# Patient Record
Sex: FEMALE | Race: WHITE | Employment: UNEMPLOYED | URBAN - METROPOLITAN AREA
[De-identification: names, ages, dates, MRNs, and addresses within clinical notes are randomized per-mention and may not be internally consistent; named-entity substitution may affect disease eponyms.]

---
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
## 2021-02-12 ENCOUNTER — HOSPITAL ENCOUNTER (EMERGENCY)
Age: 21
Discharge: HOME | End: 2021-02-12
Payer: COMMERCIAL

## 2021-02-12 VITALS
TEMPERATURE: 98.4 F | BODY MASS INDEX: 32.6 KG/M2 | HEIGHT: 63 IN | HEART RATE: 90 BPM | RESPIRATION RATE: 18 BRPM | WEIGHT: 184 LBS | SYSTOLIC BLOOD PRESSURE: 119 MMHG | DIASTOLIC BLOOD PRESSURE: 80 MMHG | OXYGEN SATURATION: 96 %

## 2021-02-12 DIAGNOSIS — J02.8 SORE THROAT (VIRAL): ICD-10-CM

## 2021-02-12 DIAGNOSIS — J06.9 VIRAL URI: Primary | ICD-10-CM

## 2021-02-12 DIAGNOSIS — Z20.822 SUSPECTED 2019 NOVEL CORONAVIRUS INFECTION: ICD-10-CM

## 2021-02-12 DIAGNOSIS — B97.89 SORE THROAT (VIRAL): ICD-10-CM

## 2021-02-12 LAB — STREP GRP A PCR: NEGATIVE

## 2021-02-12 PROCEDURE — U0003 INFECTIOUS AGENT DETECTION BY NUCLEIC ACID (DNA OR RNA); SEVERE ACUTE RESPIRATORY SYNDROME CORONAVIRUS 2 (SARS-COV-2) (CORONAVIRUS DISEASE [COVID-19]), AMPLIFIED PROBE TECHNIQUE, MAKING USE OF HIGH THROUGHPUT TECHNOLOGIES AS DESCRIBED BY CMS-2020-01-R: HCPCS

## 2021-02-12 PROCEDURE — 87880 STREP A ASSAY W/OPTIC: CPT

## 2021-02-12 PROCEDURE — 99211 OFF/OP EST MAY X REQ PHY/QHP: CPT

## 2021-02-13 ENCOUNTER — CARE COORDINATION (OUTPATIENT)
Dept: CARE COORDINATION | Age: 21
End: 2021-02-13

## 2021-02-14 LAB — SARS-COV-2, PCR: NOT DETECTED

## 2021-02-15 ENCOUNTER — CARE COORDINATION (OUTPATIENT)
Dept: CARE COORDINATION | Age: 21
End: 2021-02-15

## 2021-02-15 NOTE — ED PROVIDER NOTES
Department of Emergency [facility]  Provider Note  Admit Date/RoomTime: 2021  7:07 PM  Room:   NAME: Vivienne Sam  : 2000  MRN: [id number]     Chief Complaint:  Nasal Congestion, Cough, Pharyngitis, and Emesis    History of Present Illness      Vivienne Sam is a 24 y.o. old female who has a past medical history of: History reviewed. No pertinent past medical history. presents to the urgent care center by private vehicle, for nasal congestion and a sore throat. States that she also had 1 emesis. She denies any abdominal pain. She reports that she is pregnant--  About 7 weeks. She denies any vaginal discharge or bleeding or drainage. She started getting sick yesterday. Exposed To: Streptococcus: no.                              Infectious Mononucleosis:  no.        Symptoms:  Pain:  Yes. Muffled Voice:  No.                            Hoarse:  No.                            Difficulty with Secretions:  No.        ROS    Pertinent positives and negatives are stated within HPI, all other systems reviewed and are negative. Past Surgical History:  has a past surgical history that includes Tonsillectomy. Social History:  reports that she is a non-smoker but has been exposed to tobacco smoke. She has never used smokeless tobacco. She reports previous alcohol use. She reports that she does not use drugs. Family History: family history is not on file. Allergies: Patient has no known allergies. Physical Exam           ED Triage Vitals [21 1916]   BP Temp Temp Source Heart Rate Resp SpO2 Height Weight   119/80 98.4 °F (36.9 °C) Temporal 90 18 96 % 5' 3\" (1.6 m) 184 lb (83.5 kg)     Oxygen Saturation Interpretation: Normal.    Constitutional:  Alert, development consistent with age. .  Ears:  TMs without perforation, injection, or bulging. External canals clear without exudate.   Nose:   There is no discharge, swelling or lesions noted. Throat: Airway Patent. no erythema or exudates noted. Teeth and gums normal..       Neck/Lymphatic:  neckSupple. There is no  anterior cervical node tenderness. respiratory:  Clear to auscultation and breath sounds equal.    CV: Regular rate and rhythm, normal heart sounds, without pathological murmurs, ectopy, gallops, or rubs. GI:  Abdomen Soft, nontender, good bowel sounds. No firm or pulsatile mass. Inegument:  No rashes, erythema present. Neurological:  Oriented. Motor functions intact. Lab / Imaging Results   (All laboratory and radiology results have been personally reviewed by myself)  Labs:  Results for orders placed or performed during the hospital encounter of 02/12/21   Strep Screen Group A Throat    Specimen: Throat   Result Value Ref Range    Strep Grp A PCR Negative Negative   COVID-19   Result Value Ref Range    SARS-CoV-2, PCR Not Detected Not Detected     Imaging: All Radiology results interpreted by Radiologist unless otherwise noted. No orders to display       ED Course / Medical Decision Making   Medications - No data to display       MDM:        Patient with a sore throat congestion and slight cough her lungs are clear her sats 96% her temperature is normal at 98.4 her exam is negative I did do a rapid strep and it was negative. I also sent out testing for Covid. I advised her that the most likely this is just a viral URI she needs to treat her symptoms follow-up with her PCP if anything worsen she should get reevaluated. She should stay in isolation until her Covid test comes back. Plan of Care: Normal progression of disease discussed. All questions answered. Explained the rationale for symptomatic treatment rather than use of an antibiotic. Instruction provided in the use of fluids, vaporizer, acetaminophen, and other OTC medication for symptom control. Extra fluids  Analgesics as needed, dose reviewed.   Follow up as needed should symptoms fail to improve. Assessment     1. Viral URI    2. Sore throat (viral)    3. Suspected 2019 novel coronavirus infection      Plan   Discharge to home and advised to contact [employer], [address] [place]    Schedule an appointment as soon as possible for a visit      Patient condition is good    New Medications     Discharge Medication List as of 2/12/2021  8:02 PM        Electronically signed by SAI Parry CNP   DD: 2/15/21  **This report was transcribed using voice recognition software. Every effort was made to ensure accuracy; however, inadvertent computerized transcription errors may be present.   END OF ED PROVIDER NOTE       SAI Parry CNP  02/15/21 5929

## 2021-02-15 NOTE — CARE COORDINATION
Date/Time:  2/15/2021 10:28 AM  Second Attempt to reach  patient by telephone. Call within 2 business days of discharge: Yes   Unable to leave HIPPA compliant message requesting a return call.    Episode closed-unable to reach patient

## 2021-06-09 ENCOUNTER — HOSPITAL ENCOUNTER (OUTPATIENT)
Age: 21
Discharge: HOME | End: 2021-06-09
Payer: COMMERCIAL

## 2021-06-09 LAB
GLUCOSE TOLERANCE SCREEN 50G: 146 MG/DL (ref 70–140)
HCT VFR BLD CALC: 35 % (ref 34–48)
HEMOGLOBIN: 11.4 G/DL (ref 11.5–15.5)
MCH RBC QN AUTO: 30.2 PG (ref 26–35)
MCHC RBC AUTO-ENTMCNC: 32.6 % (ref 32–34.5)
MCV RBC AUTO: 92.6 FL (ref 80–99.9)
PDW BLD-RTO: 14.2 FL (ref 11.5–15)
PLATELET # BLD: 220 E9/L (ref 130–450)
PMV BLD AUTO: 9.9 FL (ref 7–12)
RBC # BLD: 3.78 E12/L (ref 3.5–5.5)
WBC # BLD: 9.6 E9/L (ref 4.5–11.5)

## 2021-06-09 PROCEDURE — 85027 COMPLETE CBC AUTOMATED: CPT

## 2021-06-09 PROCEDURE — 82950 GLUCOSE TEST: CPT

## 2021-06-09 PROCEDURE — 36415 COLL VENOUS BLD VENIPUNCTURE: CPT

## 2021-06-21 ENCOUNTER — HOSPITAL ENCOUNTER (OUTPATIENT)
Age: 21
Discharge: HOME | End: 2021-06-21
Payer: COMMERCIAL

## 2021-06-21 LAB
GLUCOSE FASTING: 76 MG/ML
GLUCOSE TOLERANCE TEST 1 HOUR: 121 MG/DL
GLUCOSE TOLERANCE TEST 2 HOUR: 137 MG/DL
GLUCOSE TOLERANCE TEST 3 HOUR: 117 MG/DL

## 2021-06-21 PROCEDURE — 82951 GLUCOSE TOLERANCE TEST (GTT): CPT

## 2021-06-21 PROCEDURE — 82952 GTT-ADDED SAMPLES: CPT

## 2021-06-21 PROCEDURE — 36415 COLL VENOUS BLD VENIPUNCTURE: CPT

## 2021-07-12 ENCOUNTER — HOSPITAL ENCOUNTER (OUTPATIENT)
Age: 21
Discharge: HOME | End: 2021-07-12
Attending: OBSTETRICS & GYNECOLOGY | Admitting: OBSTETRICS & GYNECOLOGY
Payer: COMMERCIAL

## 2021-07-12 VITALS
HEART RATE: 88 BPM | DIASTOLIC BLOOD PRESSURE: 71 MMHG | TEMPERATURE: 98.1 F | SYSTOLIC BLOOD PRESSURE: 129 MMHG | RESPIRATION RATE: 16 BRPM

## 2021-07-12 LAB
ALBUMIN SERPL-MCNC: 3.5 G/DL (ref 3.5–5.2)
ALP BLD-CCNC: 123 U/L (ref 35–104)
ALT SERPL-CCNC: 6 U/L (ref 0–32)
ANION GAP SERPL CALCULATED.3IONS-SCNC: 11 MMOL/L (ref 7–16)
AST SERPL-CCNC: 10 U/L (ref 0–31)
BACTERIA: NORMAL /HPF
BASOPHILS ABSOLUTE: 0.02 E9/L (ref 0–0.2)
BASOPHILS RELATIVE PERCENT: 0.2 % (ref 0–2)
BILIRUB SERPL-MCNC: 0.2 MG/DL (ref 0–1.2)
BILIRUBIN URINE: NEGATIVE
BLOOD, URINE: NEGATIVE
BUN BLDV-MCNC: 5 MG/DL (ref 6–20)
CALCIUM SERPL-MCNC: 8.9 MG/DL (ref 8.6–10.2)
CHLORIDE BLD-SCNC: 107 MMOL/L (ref 98–107)
CLARITY: CLEAR
CO2: 20 MMOL/L (ref 22–29)
COLOR: YELLOW
CREAT SERPL-MCNC: 0.5 MG/DL (ref 0.5–1)
EOSINOPHILS ABSOLUTE: 0.03 E9/L (ref 0.05–0.5)
EOSINOPHILS RELATIVE PERCENT: 0.3 % (ref 0–6)
GFR AFRICAN AMERICAN: >60
GFR NON-AFRICAN AMERICAN: >60 ML/MIN/1.73
GLUCOSE BLD-MCNC: 95 MG/DL (ref 74–99)
GLUCOSE URINE: NEGATIVE MG/DL
HCT VFR BLD CALC: 34.6 % (ref 34–48)
HEMOGLOBIN: 11.2 G/DL (ref 11.5–15.5)
IMMATURE GRANULOCYTES #: 0.05 E9/L
IMMATURE GRANULOCYTES %: 0.5 % (ref 0–5)
KETONES, URINE: NEGATIVE MG/DL
LEUKOCYTE ESTERASE, URINE: NEGATIVE
LYMPHOCYTES ABSOLUTE: 1.37 E9/L (ref 1.5–4)
LYMPHOCYTES RELATIVE PERCENT: 14 % (ref 20–42)
MCH RBC QN AUTO: 29.9 PG (ref 26–35)
MCHC RBC AUTO-ENTMCNC: 32.4 % (ref 32–34.5)
MCV RBC AUTO: 92.3 FL (ref 80–99.9)
MONOCYTES ABSOLUTE: 0.58 E9/L (ref 0.1–0.95)
MONOCYTES RELATIVE PERCENT: 5.9 % (ref 2–12)
NEUTROPHILS ABSOLUTE: 7.72 E9/L (ref 1.8–7.3)
NEUTROPHILS RELATIVE PERCENT: 79.1 % (ref 43–80)
NITRITE, URINE: NEGATIVE
PDW BLD-RTO: 13.5 FL (ref 11.5–15)
PH UA: 8 (ref 5–9)
PLATELET # BLD: 208 E9/L (ref 130–450)
PMV BLD AUTO: 10.7 FL (ref 7–12)
POTASSIUM SERPL-SCNC: 4 MMOL/L (ref 3.5–5)
PROTEIN UA: NEGATIVE MG/DL
RBC # BLD: 3.75 E12/L (ref 3.5–5.5)
RBC UA: NORMAL /HPF (ref 0–2)
SODIUM BLD-SCNC: 138 MMOL/L (ref 132–146)
SPECIFIC GRAVITY UA: 1.01 (ref 1–1.03)
TOTAL PROTEIN: 6.3 G/DL (ref 6.4–8.3)
UROBILINOGEN, URINE: 0.2 E.U./DL
WBC # BLD: 9.8 E9/L (ref 4.5–11.5)
WBC UA: NORMAL /HPF (ref 0–5)

## 2021-07-12 PROCEDURE — 81001 URINALYSIS AUTO W/SCOPE: CPT

## 2021-07-12 PROCEDURE — 85025 COMPLETE CBC W/AUTO DIFF WBC: CPT

## 2021-07-12 PROCEDURE — 36415 COLL VENOUS BLD VENIPUNCTURE: CPT

## 2021-07-12 PROCEDURE — 99202 OFFICE O/P NEW SF 15 MIN: CPT

## 2021-07-12 PROCEDURE — 2580000003 HC RX 258: Performed by: OBSTETRICS & GYNECOLOGY

## 2021-07-12 PROCEDURE — 80053 COMPREHEN METABOLIC PANEL: CPT

## 2021-07-12 RX ORDER — SODIUM CHLORIDE, SODIUM LACTATE, POTASSIUM CHLORIDE, CALCIUM CHLORIDE 600; 310; 30; 20 MG/100ML; MG/100ML; MG/100ML; MG/100ML
INJECTION, SOLUTION INTRAVENOUS CONTINUOUS
Status: DISCONTINUED | OUTPATIENT
Start: 2021-07-12 | End: 2021-07-12 | Stop reason: HOSPADM

## 2021-07-12 RX ORDER — SODIUM CHLORIDE, SODIUM LACTATE, POTASSIUM CHLORIDE, AND CALCIUM CHLORIDE .6; .31; .03; .02 G/100ML; G/100ML; G/100ML; G/100ML
500 INJECTION, SOLUTION INTRAVENOUS ONCE
Status: COMPLETED | OUTPATIENT
Start: 2021-07-12 | End: 2021-07-12

## 2021-07-12 RX ADMIN — SODIUM CHLORIDE, POTASSIUM CHLORIDE, SODIUM LACTATE AND CALCIUM CHLORIDE 500 ML: 600; 310; 30; 20 INJECTION, SOLUTION INTRAVENOUS at 13:35

## 2021-07-12 RX ADMIN — SODIUM CHLORIDE, POTASSIUM CHLORIDE, SODIUM LACTATE AND CALCIUM CHLORIDE: 600; 310; 30; 20 INJECTION, SOLUTION INTRAVENOUS at 17:37

## 2021-07-12 ASSESSMENT — PAIN DESCRIPTION - DESCRIPTORS
DESCRIPTORS: ACHING;DISCOMFORT;SHARP
DESCRIPTORS: ACHING;DISCOMFORT;SHARP

## 2021-07-12 NOTE — H&P
Subjective:      Tangela Chance is an 24 y.o. female at 27 and 5/7 weeks gestation presenting with   Chief Complaint   Patient presents with    Abdominal Pain     RLQ    Lower Back Pain     Rt. sided    Nausea & Vomiting   . She is also complaining of contractions every a few minutes lasting few seconds. Other associated symptoms include low back pain. Fetal Movement: normal.  History reviewed. No pertinent past medical history. Review of Systems  Pertinent items are noted in HPI. Objective:      /71   Pulse 88   Temp 98.1 °F (36.7 °C)   Resp 16   LMP 12/09/2020   Blood pressure 129/71, pulse 88, temperature 98.1 °F (36.7 °C), resp. rate 16, last menstrual period 12/09/2020. General:   alert, appears stated age and cooperative   Cervix:   defferred.    FHT:   145 BPM     Lab Review    CBC:   Lab Results   Component Value Date    WBC 9.8 07/12/2021    RBC 3.75 07/12/2021    HGB 11.2 07/12/2021    HCT 34.6 07/12/2021    MCV 92.3 07/12/2021    MCH 29.9 07/12/2021    MCHC 32.4 07/12/2021    RDW 13.5 07/12/2021     07/12/2021    MPV 10.7 07/12/2021     CMP:    Lab Results   Component Value Date     07/12/2021    K 4.0 07/12/2021     07/12/2021    CO2 20 07/12/2021    BUN 5 07/12/2021    CREATININE 0.5 07/12/2021    GFRAA >60 07/12/2021    LABGLOM >60 07/12/2021    GLUCOSE 95 07/12/2021    PROT 6.3 07/12/2021    LABALBU 3.5 07/12/2021    CALCIUM 8.9 07/12/2021    BILITOT 0.2 07/12/2021    ALKPHOS 123 07/12/2021    AST 10 07/12/2021    ALT 6 07/12/2021     U/A:    Lab Results   Component Value Date    COLORU Yellow 07/12/2021    PHUR 8.0 07/12/2021    WBCUA 0-1 07/12/2021    RBCUA 0-1 07/12/2021    BACTERIA NONE SEEN 07/12/2021    CLARITYU Clear 07/12/2021    SPECGRAV 1.015 07/12/2021    LEUKOCYTESUR Negative 07/12/2021    UROBILINOGEN 0.2 07/12/2021    BILIRUBINUR Negative 07/12/2021    BLOODU Negative 07/12/2021    GLUCOSEU Negative 07/12/2021          Assessment:     Nausea and vomiting  30 5/7 weeks     Plan:     IV fluids.  Discharge home    Elva Lopez MD,MD,7/12/2021 7:08 PM

## 2021-07-12 NOTE — PROGRESS NOTES
Taking over care of the patient for the afternoon shift. Plan of care discussed with patient, patient verbalized understanding. Call light within reach.

## 2021-07-12 NOTE — PROGRESS NOTES
No c/o's at this time. States pain is unchanged but appears comfortable. General Diet tray given.  Reactive FHR tracing observed with no contractions

## 2021-07-12 NOTE — PROGRESS NOTES
23 yo  EDC 9-15-21 here to L&D with c/o onset nausea last night approx 0200 with emesis x1. Also c/o RLQ to Rt. Lower back pain since that time and states pain about 7/10. Also states feels RUQ abdominal discomfort. Denies S&S labor. Reports feeling good fetal activity. Denies contractions, vaginal bleeding or leakage of fluiud. Voided QS after arrival with specimen obtained. Assisted into bed with EFM applied. Support person at bedside. Call bell in reach.

## 2021-09-05 ENCOUNTER — ANESTHESIA (OUTPATIENT)
Dept: LABOR AND DELIVERY | Age: 21
DRG: 560 | End: 2021-09-05
Payer: COMMERCIAL

## 2021-09-05 ENCOUNTER — ANESTHESIA EVENT (OUTPATIENT)
Dept: LABOR AND DELIVERY | Age: 21
DRG: 560 | End: 2021-09-05
Payer: COMMERCIAL

## 2021-09-05 ENCOUNTER — HOSPITAL ENCOUNTER (INPATIENT)
Age: 21
LOS: 3 days | Discharge: HOME | DRG: 560 | End: 2021-09-08
Attending: OBSTETRICS & GYNECOLOGY | Admitting: OBSTETRICS & GYNECOLOGY
Payer: COMMERCIAL

## 2021-09-05 LAB
ABO/RH: NORMAL
AMPHETAMINE SCREEN, URINE: NOT DETECTED
ANION GAP SERPL CALCULATED.3IONS-SCNC: 10 MMOL/L (ref 7–16)
ANTIBODY SCREEN: NORMAL
BARBITURATE SCREEN URINE: NOT DETECTED
BENZODIAZEPINE SCREEN, URINE: NOT DETECTED
BUN BLDV-MCNC: 5 MG/DL (ref 6–20)
CALCIUM SERPL-MCNC: 10 MG/DL (ref 8.6–10.2)
CANNABINOID SCREEN URINE: NOT DETECTED
CHLORIDE BLD-SCNC: 105 MMOL/L (ref 98–107)
CO2: 19 MMOL/L (ref 22–29)
COCAINE METABOLITE SCREEN URINE: NOT DETECTED
CREAT SERPL-MCNC: 0.5 MG/DL (ref 0.5–1)
FENTANYL SCREEN, URINE: NOT DETECTED
GFR AFRICAN AMERICAN: >60
GFR NON-AFRICAN AMERICAN: >60 ML/MIN/1.73
GLUCOSE BLD-MCNC: 85 MG/DL (ref 74–99)
HCT VFR BLD CALC: 35.1 % (ref 34–48)
HEMOGLOBIN: 11 G/DL (ref 11.5–15.5)
Lab: NORMAL
MCH RBC QN AUTO: 27.8 PG (ref 26–35)
MCHC RBC AUTO-ENTMCNC: 31.3 % (ref 32–34.5)
MCV RBC AUTO: 88.6 FL (ref 80–99.9)
METHADONE SCREEN, URINE: NOT DETECTED
OPIATE SCREEN URINE: NOT DETECTED
OXYCODONE URINE: NOT DETECTED
PDW BLD-RTO: 13.2 FL (ref 11.5–15)
PHENCYCLIDINE SCREEN URINE: NOT DETECTED
PLATELET # BLD: 207 E9/L (ref 130–450)
PMV BLD AUTO: 11.3 FL (ref 7–12)
POTASSIUM SERPL-SCNC: 3.8 MMOL/L (ref 3.5–5)
RBC # BLD: 3.96 E12/L (ref 3.5–5.5)
SARS-COV-2, NAAT: NOT DETECTED
SODIUM BLD-SCNC: 134 MMOL/L (ref 132–146)
WBC # BLD: 11.2 E9/L (ref 4.5–11.5)

## 2021-09-05 PROCEDURE — 87635 SARS-COV-2 COVID-19 AMP PRB: CPT

## 2021-09-05 PROCEDURE — 80048 BASIC METABOLIC PNL TOTAL CA: CPT

## 2021-09-05 PROCEDURE — 1220000001 HC SEMI PRIVATE L&D R&B

## 2021-09-05 PROCEDURE — 80307 DRUG TEST PRSMV CHEM ANLYZR: CPT

## 2021-09-05 PROCEDURE — 86901 BLOOD TYPING SEROLOGIC RH(D): CPT

## 2021-09-05 PROCEDURE — 86900 BLOOD TYPING SEROLOGIC ABO: CPT

## 2021-09-05 PROCEDURE — 2500000003 HC RX 250 WO HCPCS: Performed by: ANESTHESIOLOGY

## 2021-09-05 PROCEDURE — 85027 COMPLETE CBC AUTOMATED: CPT

## 2021-09-05 PROCEDURE — 86850 RBC ANTIBODY SCREEN: CPT

## 2021-09-05 PROCEDURE — 84112 EVAL AMNIOTIC FLUID PROTEIN: CPT

## 2021-09-05 PROCEDURE — 36415 COLL VENOUS BLD VENIPUNCTURE: CPT

## 2021-09-05 PROCEDURE — 2580000003 HC RX 258: Performed by: OBSTETRICS & GYNECOLOGY

## 2021-09-05 PROCEDURE — 6360000002 HC RX W HCPCS: Performed by: OBSTETRICS & GYNECOLOGY

## 2021-09-05 PROCEDURE — 3700000025 EPIDURAL BLOCK: Performed by: ANESTHESIOLOGY

## 2021-09-05 PROCEDURE — 6370000000 HC RX 637 (ALT 250 FOR IP): Performed by: OBSTETRICS & GYNECOLOGY

## 2021-09-05 RX ORDER — SODIUM CHLORIDE, SODIUM LACTATE, POTASSIUM CHLORIDE, AND CALCIUM CHLORIDE .6; .31; .03; .02 G/100ML; G/100ML; G/100ML; G/100ML
500 INJECTION, SOLUTION INTRAVENOUS PRN
Status: DISCONTINUED | OUTPATIENT
Start: 2021-09-05 | End: 2021-09-08 | Stop reason: HOSPADM

## 2021-09-05 RX ORDER — ONDANSETRON 2 MG/ML
4 INJECTION INTRAMUSCULAR; INTRAVENOUS EVERY 6 HOURS PRN
Status: DISCONTINUED | OUTPATIENT
Start: 2021-09-05 | End: 2021-09-05 | Stop reason: SDUPTHER

## 2021-09-05 RX ORDER — ONDANSETRON 2 MG/ML
4 INJECTION INTRAMUSCULAR; INTRAVENOUS EVERY 6 HOURS PRN
Status: DISCONTINUED | OUTPATIENT
Start: 2021-09-05 | End: 2021-09-08 | Stop reason: HOSPADM

## 2021-09-05 RX ORDER — SODIUM CHLORIDE, SODIUM LACTATE, POTASSIUM CHLORIDE, AND CALCIUM CHLORIDE .6; .31; .03; .02 G/100ML; G/100ML; G/100ML; G/100ML
1000 INJECTION, SOLUTION INTRAVENOUS PRN
Status: DISCONTINUED | OUTPATIENT
Start: 2021-09-05 | End: 2021-09-08 | Stop reason: HOSPADM

## 2021-09-05 RX ORDER — NALOXONE HYDROCHLORIDE 0.4 MG/ML
0.4 INJECTION, SOLUTION INTRAMUSCULAR; INTRAVENOUS; SUBCUTANEOUS PRN
Status: DISCONTINUED | OUTPATIENT
Start: 2021-09-05 | End: 2021-09-08 | Stop reason: HOSPADM

## 2021-09-05 RX ORDER — EPHEDRINE SULFATE/0.9% NACL/PF 50 MG/5 ML
5 SYRINGE (ML) INTRAVENOUS PRN
Status: DISCONTINUED | OUTPATIENT
Start: 2021-09-05 | End: 2021-09-08 | Stop reason: HOSPADM

## 2021-09-05 RX ORDER — LIDOCAINE HYDROCHLORIDE 10 MG/ML
INJECTION, SOLUTION EPIDURAL; INFILTRATION; INTRACAUDAL; PERINEURAL
Status: DISPENSED
Start: 2021-09-05 | End: 2021-09-06

## 2021-09-05 RX ORDER — SODIUM CHLORIDE, SODIUM LACTATE, POTASSIUM CHLORIDE, CALCIUM CHLORIDE 600; 310; 30; 20 MG/100ML; MG/100ML; MG/100ML; MG/100ML
INJECTION, SOLUTION INTRAVENOUS CONTINUOUS
Status: DISCONTINUED | OUTPATIENT
Start: 2021-09-05 | End: 2021-09-08 | Stop reason: HOSPADM

## 2021-09-05 RX ORDER — NALBUPHINE HCL 10 MG/ML
5 AMPUL (ML) INJECTION EVERY 4 HOURS PRN
Status: DISCONTINUED | OUTPATIENT
Start: 2021-09-05 | End: 2021-09-08 | Stop reason: HOSPADM

## 2021-09-05 RX ORDER — CALCIUM CARBONATE 200(500)MG
500 TABLET,CHEWABLE ORAL 3 TIMES DAILY PRN
Status: DISCONTINUED | OUTPATIENT
Start: 2021-09-05 | End: 2021-09-08 | Stop reason: HOSPADM

## 2021-09-05 RX ADMIN — ONDANSETRON 4 MG: 2 INJECTION INTRAMUSCULAR; INTRAVENOUS at 22:59

## 2021-09-05 RX ADMIN — SODIUM CHLORIDE, POTASSIUM CHLORIDE, SODIUM LACTATE AND CALCIUM CHLORIDE: 600; 310; 30; 20 INJECTION, SOLUTION INTRAVENOUS at 08:15

## 2021-09-05 RX ADMIN — SODIUM CHLORIDE, POTASSIUM CHLORIDE, SODIUM LACTATE AND CALCIUM CHLORIDE: 600; 310; 30; 20 INJECTION, SOLUTION INTRAVENOUS at 14:15

## 2021-09-05 RX ADMIN — Medication 12 ML: at 15:03

## 2021-09-05 RX ADMIN — CALCIUM CARBONATE (ANTACID) CHEW TAB 500 MG 500 MG: 500 CHEW TAB at 19:39

## 2021-09-05 RX ADMIN — Medication 12 ML/HR: at 15:09

## 2021-09-05 RX ADMIN — SODIUM CHLORIDE, POTASSIUM CHLORIDE, SODIUM LACTATE AND CALCIUM CHLORIDE: 600; 310; 30; 20 INJECTION, SOLUTION INTRAVENOUS at 16:04

## 2021-09-05 RX ADMIN — Medication 2 MILLI-UNITS/MIN: at 08:15

## 2021-09-05 ASSESSMENT — PAIN DESCRIPTION - LOCATION: LOCATION: ABDOMEN

## 2021-09-05 ASSESSMENT — PAIN DESCRIPTION - PAIN TYPE: TYPE: ACUTE PAIN

## 2021-09-05 ASSESSMENT — PAIN SCALES - GENERAL
PAINLEVEL_OUTOF10: 3
PAINLEVEL_OUTOF10: 0
PAINLEVEL_OUTOF10: 0

## 2021-09-05 ASSESSMENT — PAIN DESCRIPTION - DESCRIPTORS: DESCRIPTORS: CRAMPING

## 2021-09-05 NOTE — H&P
Subjective:      Sahara Hernandez is an 22 y.o. female at 45 and 4/7 weeks gestation presenting with spontaneous rupture of membranes earlier this morning. Denies any bleeding. Starting to feel some contractions every a few minutes lasting few seconds. Other associated symptoms include low back pain. Fetal Movement: normal.  No past medical history on file. Review of Systems  Pertinent items are noted in HPI. Objective:      LMP 12/09/2020   Last menstrual period 12/09/2020.   General:   alert, appears stated age and cooperative   Cervix:   1-2, 50%, vertex at S- 2..   FHT:   145 BPM     Lab Review    CBC:   Lab Results   Component Value Date    WBC 11.2 09/05/2021    RBC 3.96 09/05/2021    HGB 11.0 09/05/2021    HCT 35.1 09/05/2021    MCV 88.6 09/05/2021    MCH 27.8 09/05/2021    MCHC 31.3 09/05/2021    RDW 13.2 09/05/2021     09/05/2021    MPV 11.3 09/05/2021     CMP:    Lab Results   Component Value Date     09/05/2021    K 3.8 09/05/2021     09/05/2021    CO2 19 09/05/2021    BUN 5 09/05/2021    CREATININE 0.5 09/05/2021    GFRAA >60 09/05/2021    LABGLOM >60 09/05/2021    GLUCOSE 85 09/05/2021    PROT 6.3 07/12/2021    LABALBU 3.5 07/12/2021    CALCIUM 10.0 09/05/2021    BILITOT 0.2 07/12/2021    ALKPHOS 123 07/12/2021    AST 10 07/12/2021    ALT 6 07/12/2021     U/A:    Lab Results   Component Value Date    COLORU Yellow 07/12/2021    PHUR 8.0 07/12/2021    WBCUA 0-1 07/12/2021    RBCUA 0-1 07/12/2021    BACTERIA NONE SEEN 07/12/2021    CLARITYU Clear 07/12/2021    SPECGRAV 1.015 07/12/2021    LEUKOCYTESUR Negative 07/12/2021    UROBILINOGEN 0.2 07/12/2021    BILIRUBINUR Negative 07/12/2021    BLOODU Negative 07/12/2021    GLUCOSEU Negative 07/12/2021          Assessment:      Early labor    Spontaneous rupture of membranes  Negative GBS     Plan:     Expectant management, Pitocin augmentation, epidural if desired    Jo Barker MD,MD,9/5/2021 9:50 AM

## 2021-09-05 NOTE — PROGRESS NOTES
Dr. Kaur Deras notified of pt arrival, complaints, positive amnisure, EFM readings, and SVE. Orders received to start Pitocin.

## 2021-09-05 NOTE — ANESTHESIA PRE PROCEDURE
Department of Anesthesiology  Preprocedure Note       Name:  Sahara Hernandez   Age:  22 y.o.  :  2000                                          MRN:  [id number]         Date:  2021      Surgeon: * No surgeons listed *    Procedure: * No procedures listed *    Medications prior to admission:   Prior to Admission medications    Medication Sig Start Date End Date Taking? Authorizing Provider   Prenatal MV-Min-Fe Fum-FA-DHA (PRENATAL 1 PO) Take by mouth    Historical Provider, MD   ondansetron (ZOFRAN-ODT) 4 MG disintegrating tablet Place 1 tablet under the tongue every 8 hours as needed for Nausea or Vomiting May Sub regular tablet (non-ODT) if insurance does not cover ODT.  10/30/20   SAI Hare - CNP   cetirizine (ZYRTEC ALLERGY) 10 MG tablet Take 1 tablet by mouth daily as needed for Allergies or Rhinitis 18   Tyra Jimenez PA-C       Current medications:    Current Facility-Administered Medications   Medication Dose Route Frequency Provider Last Rate Last Admin    lactated ringers infusion   IntraVENous Continuous Amine JAMARI Barker  mL/hr at 21 0815 New Bag at 21 0815    lactated ringers bolus  500 mL IntraVENous PRN Amine JAMARI Barker MD        Or    lactated ringers bolus  1,000 mL IntraVENous PRN Amine JAMARI Barker MD        oxytocin (PITOCIN) 30 units in 500 mL infusion  87.3 david-units/min IntraVENous Continuous PRN Amine JAMARI Barker MD        And    oxytocin (PITOCIN) 30 units in 500 mL infusion  10 Units IntraVENous PRN Amine JAMARI Barker MD        ondansetron (ZOFRAN) injection 4 mg  4 mg IntraVENous Q6H PRN Amine JAMARI Barker MD        oxytocin (PITOCIN) 30 units in 500 mL infusion  1-20 david-units/min IntraVENous Continuous Amine JAMARI Barker MD 20 mL/hr at 21 1345 20 david-units/min at 21 1345    ePHEDrine injection 5 mg  5 mg IntraVENous PRN Ramandeep Sharma MD        fentaNYL 1.85mcg/ml and bupivacaine 0.1% 15ml syringe (Home Care) (OB) 15 mL epidural  15 mL Epidural Once Dilan Fierro MD        fentaNYL 1.85mcg/ml and Bupivicaine 0.1% in 0.9% NS 135ml infusion (OB) epidural  15 mL/hr Epidural Continuous Dilan Fierro MD        naloxone [facility]) injection 0.4 mg  0.4 mg IntraVENous PRN Dilan Fierro MD        nalbuphine (NUBAIN) injection 5 mg  5 mg IntraVENous Q4H PRN Dilan Fierro MD        ondansetron [facility]) injection 4 mg  4 mg IntraVENous Q6H PRN Dilan Fierro MD           Allergies:  No Known Allergies    Problem List:    Patient Active Problem List   Diagnosis Code    30 weeks gestation of pregnancy Z3A.30    Normal pregnancy in third trimester Z34.93       Past Medical History:  No past medical history on file. Past Surgical History:        Procedure Laterality Date    TONSILLECTOMY      TONSILLECTOMY  age 9       Social History:    Social History     Tobacco Use    Smoking status: Passive Smoke Exposure - Never Smoker    Smokeless tobacco: Never Used   Substance Use Topics    Alcohol use: Not Currently                                Counseling given: Not Answered      Vital Signs (Current):   Vitals:    09/05/21 1215 09/05/21 1245 09/05/21 1300 09/05/21 1315   BP: 124/74 (!) 147/80  (!) 144/85   Pulse: 91 90  83   Resp: 18 18 18 20   Temp: 98.2 °F (36.8 °C)      SpO2:       Weight:       Height:                                                  BP Readings from Last 3 Encounters:   09/05/21 (!) 144/85   07/12/21 129/71   02/12/21 119/80       NPO Status:                                                                                 BMI:   Wt Readings from Last 3 Encounters:   09/05/21 186 lb (84.4 kg)   02/12/21 184 lb (83.5 kg) (95 %, Z= 1.68)*   10/30/20 150 lb (68 kg) (81 %, Z= 0.89)*     * Growth percentiles are based on CDC (Girls, 2-20 Years) data. Body mass index is 32.95 kg/m².     CBC:   Lab Results   Component Value Date    WBC 11.2 09/05/2021    RBC 3.96 09/05/2021    HGB 11.0 09/05/2021    HCT 35.1 09/05/2021    MCV 88.6 09/05/2021    RDW 13.2 09/05/2021     09/05/2021       CMP:   Lab Results   Component Value Date     09/05/2021    K 3.8 09/05/2021     09/05/2021    CO2 19 09/05/2021    BUN 5 09/05/2021    CREATININE 0.5 09/05/2021    GFRAA >60 09/05/2021    LABGLOM >60 09/05/2021    GLUCOSE 85 09/05/2021    PROT 6.3 07/12/2021    CALCIUM 10.0 09/05/2021    BILITOT 0.2 07/12/2021    ALKPHOS 123 07/12/2021    AST 10 07/12/2021    ALT 6 07/12/2021       POC Tests: No results for input(s): POCGLU, POCNA, POCK, POCCL, POCBUN, POCHEMO, POCHCT in the last 72 hours. Coags: No results found for: PROTIME, INR, APTT    HCG (If Applicable):   Lab Results   Component Value Date    PREGTESTUR NEGATIVE 10/30/2020        ABGs: No results found for: PHART, PO2ART, ZZB7CHU, TMI0DNF, BEART, R3KFMUAN     Type & Screen (If Applicable):  No results found for: LABABO, LABRH    Drug/Infectious Status (If Applicable):  No results found for: HIV, HEPCAB    COVID-19 Screening (If Applicable):   Lab Results   Component Value Date    COVID19 Not Detected 09/05/2021    COVID19 Not Detected 02/12/2021           Anesthesia Evaluation  Patient summary reviewed no history of anesthetic complications:   Airway: Mallampati: II  TM distance: >3 FB   Neck ROM: full  Mouth opening: > = 3 FB Dental: normal exam         Pulmonary:Negative Pulmonary ROS breath sounds clear to auscultation                             Cardiovascular:Negative CV ROS            Rhythm: regular  Rate: normal                    Neuro/Psych:   Negative Neuro/Psych ROS              GI/Hepatic/Renal:        (-) no morbid obesity       Endo/Other: Negative Endo/Other ROS                    Abdominal:   (+) obese,           Vascular: negative vascular ROS. Other Findings:             Anesthesia Plan      general     ASA 2       Induction: intravenous. Anesthetic plan and risks discussed with patient. Plan discussed with CRNA.             DOS STAFF

## 2021-09-05 NOTE — PROGRESS NOTES
Anesthesia at bedside for epidural per orders and pt request.    1445 Dr Cristina Sterling in room. 1450 Procedure started. Epidural in place at 1455  Bolus dose given at 1457  Test dose given at 1503  Epidural hung on pump at 1508. Vitals remained stable.

## 2021-09-05 NOTE — ANESTHESIA PROCEDURE NOTES
Epidural Block    Patient location during procedure: OB  Reason for block: labor epidural  Staffing  Performed: anesthesiologist   Anesthesiologist: Dilan Fierro MD  Preanesthetic Checklist  Completed: patient identified, IV checked, site marked, risks and benefits discussed, surgical consent, monitors and equipment checked, pre-op evaluation, timeout performed, anesthesia consent given, oxygen available and patient being monitored  Epidural  Patient position: sitting  Prep: ChloraPrep  Patient monitoring: cardiac monitor, continuous pulse ox and frequent blood pressure checks  Approach: midline  Location: lumbar (1-5)  Injection technique: HAYLEY air  Provider prep: mask and sterile gloves  Needle  Needle type: Tuohy   Needle gauge: 18 G  Needle length: 2.5 in  Needle insertion depth: 6 cm  Catheter type: end hole  Catheter size: 20 G.   Catheter at skin depth: 10 cm  Test dose: negative  Assessment  Sensory level: T8  Hemodynamics: stable

## 2021-09-05 NOTE — PROGRESS NOTES
1 para 0 due 9/15/21 she is 38 weeks and 4 days arrived to L&D with c/o LOF. Pt taken to [address] room orientation provided. Pt perceives positive fetal movement and denies any vaginal bleeding. Audible heart tones heard by RN. Assessment, amnisure, and SVE performed. Amnisure positive. Comfort needs met, call light within reach.

## 2021-09-05 NOTE — PROGRESS NOTES
Fetal heart rate and uterine activity being monitored every 15 minutes by RN while pt in labor. Two RNs verified reactive tracing.

## 2021-09-06 PROCEDURE — 7200000001 HC VAGINAL DELIVERY

## 2021-09-06 PROCEDURE — 2500000003 HC RX 250 WO HCPCS: Performed by: ANESTHESIOLOGY

## 2021-09-06 PROCEDURE — 6370000000 HC RX 637 (ALT 250 FOR IP): Performed by: OBSTETRICS & GYNECOLOGY

## 2021-09-06 PROCEDURE — 1220000001 HC SEMI PRIVATE L&D R&B

## 2021-09-06 RX ORDER — OXYCODONE HYDROCHLORIDE AND ACETAMINOPHEN 5; 325 MG/1; MG/1
2 TABLET ORAL EVERY 4 HOURS PRN
Status: DISCONTINUED | OUTPATIENT
Start: 2021-09-06 | End: 2021-09-08 | Stop reason: HOSPADM

## 2021-09-06 RX ORDER — FERROUS SULFATE 325(65) MG
325 TABLET ORAL 2 TIMES DAILY WITH MEALS
Status: DISCONTINUED | OUTPATIENT
Start: 2021-09-07 | End: 2021-09-08 | Stop reason: HOSPADM

## 2021-09-06 RX ORDER — SODIUM CHLORIDE 9 MG/ML
25 INJECTION, SOLUTION INTRAVENOUS PRN
Status: DISCONTINUED | OUTPATIENT
Start: 2021-09-06 | End: 2021-09-08 | Stop reason: HOSPADM

## 2021-09-06 RX ORDER — OXYCODONE HYDROCHLORIDE AND ACETAMINOPHEN 5; 325 MG/1; MG/1
1 TABLET ORAL EVERY 4 HOURS PRN
Status: DISCONTINUED | OUTPATIENT
Start: 2021-09-06 | End: 2021-09-08 | Stop reason: HOSPADM

## 2021-09-06 RX ORDER — ONDANSETRON 4 MG/1
8 TABLET, ORALLY DISINTEGRATING ORAL EVERY 8 HOURS PRN
Status: DISCONTINUED | OUTPATIENT
Start: 2021-09-06 | End: 2021-09-08 | Stop reason: HOSPADM

## 2021-09-06 RX ORDER — IBUPROFEN 800 MG/1
800 TABLET ORAL EVERY 6 HOURS PRN
Status: DISCONTINUED | OUTPATIENT
Start: 2021-09-06 | End: 2021-09-08 | Stop reason: HOSPADM

## 2021-09-06 RX ORDER — ACETAMINOPHEN 325 MG/1
650 TABLET ORAL EVERY 4 HOURS PRN
Status: DISCONTINUED | OUTPATIENT
Start: 2021-09-06 | End: 2021-09-08 | Stop reason: HOSPADM

## 2021-09-06 RX ORDER — SODIUM CHLORIDE 0.9 % (FLUSH) 0.9 %
5-40 SYRINGE (ML) INJECTION PRN
Status: DISCONTINUED | OUTPATIENT
Start: 2021-09-06 | End: 2021-09-08 | Stop reason: HOSPADM

## 2021-09-06 RX ORDER — DOCUSATE SODIUM 100 MG/1
100 CAPSULE, LIQUID FILLED ORAL 2 TIMES DAILY
Status: DISCONTINUED | OUTPATIENT
Start: 2021-09-06 | End: 2021-09-08 | Stop reason: HOSPADM

## 2021-09-06 RX ORDER — SODIUM CHLORIDE 0.9 % (FLUSH) 0.9 %
5-40 SYRINGE (ML) INJECTION EVERY 12 HOURS SCHEDULED
Status: DISCONTINUED | OUTPATIENT
Start: 2021-09-06 | End: 2021-09-08 | Stop reason: HOSPADM

## 2021-09-06 RX ORDER — MODIFIED LANOLIN
OINTMENT (GRAM) TOPICAL PRN
Status: DISCONTINUED | OUTPATIENT
Start: 2021-09-06 | End: 2021-09-08 | Stop reason: HOSPADM

## 2021-09-06 RX ADMIN — OXYCODONE AND ACETAMINOPHEN 1 TABLET: 5; 325 TABLET ORAL at 19:30

## 2021-09-06 RX ADMIN — IBUPROFEN 800 MG: 800 TABLET, FILM COATED ORAL at 14:49

## 2021-09-06 RX ADMIN — DOCUSATE SODIUM 100 MG: 100 CAPSULE, LIQUID FILLED ORAL at 22:38

## 2021-09-06 RX ADMIN — Medication 15 ML/HR: at 01:20

## 2021-09-06 RX ADMIN — BENZOCAINE AND LEVOMENTHOL: 200; 5 SPRAY TOPICAL at 05:49

## 2021-09-06 RX ADMIN — IBUPROFEN 800 MG: 800 TABLET, FILM COATED ORAL at 05:49

## 2021-09-06 RX ADMIN — DOCUSATE SODIUM 100 MG: 100 CAPSULE, LIQUID FILLED ORAL at 08:09

## 2021-09-06 RX ADMIN — IBUPROFEN 800 MG: 800 TABLET, FILM COATED ORAL at 23:18

## 2021-09-06 ASSESSMENT — PAIN SCALES - GENERAL
PAINLEVEL_OUTOF10: 5
PAINLEVEL_OUTOF10: 4

## 2021-09-06 ASSESSMENT — PAIN DESCRIPTION - DESCRIPTORS: DESCRIPTORS: ACHING;CRAMPING

## 2021-09-06 ASSESSMENT — PAIN DESCRIPTION - LOCATION: LOCATION: ABDOMEN

## 2021-09-06 ASSESSMENT — PAIN DESCRIPTION - PAIN TYPE: TYPE: ACUTE PAIN

## 2021-09-06 ASSESSMENT — PAIN DESCRIPTION - FREQUENCY: FREQUENCY: INTERMITTENT

## 2021-09-06 ASSESSMENT — PAIN DESCRIPTION - ORIENTATION: ORIENTATION: MID;LOWER

## 2021-09-06 NOTE — L&D DELIVERY NOTE
Department of Obstetrics and Gynecology  Spontaneous Vaginal Delivery Note         Pre-operative Diagnosis:  Term pregnancy, Induced labor, Single fetus and Uncomplicated pregnancy    Post-operative Diagnosis:  Same + live male wt 7 #,7 oz  Procedure:  Spontaneous vaginal delivery or Midline episiotomy and repair    Surgeon:  Kyle Mckeon MD    Anesthesia:  epidural anesthesia    Estimated blood loss:  200    Specimen:  Placenta not sent to pathology     Cord blood sent Yes    Complications:  none    Condition:  infant stable to general nursery and mother stable    Details of Procedure: The patient is a 22 y.o. female at 44w7d   OB History        1    Para        Term                AB        Living           SAB        TAB        Ectopic        Molar        Multiple        Live Births                 who was admitted for latent labor. She received the following interventions: IV Pitocin induction and IV Pitocin augmentation She was known to be GBS negative and did not receive antibiotic prophylaxis. The patient progressed well,did receive an epidural, became complete and started to push. After pushing for 20 minutes, the fetal head was at the perineum, nose and mouth suctioned with bulb suction and the rest of the infant delivered atraumatically, placed on mother abdomen. Cord was clamped and cut and infant handed off to the waiting nurse for evaluation. The delivery of the placenta was spontaneous. The perineum and vagina were explored and a second degree episiotomy was repaired in standard fashion.

## 2021-09-06 NOTE — PROGRESS NOTES
Department of Obstetrics and Gynecology  Labor and Delivery   Attending Progress Note      SUBJECTIVE:  Comfortable with epidural    OBJECTIVE:  Blood pressure (!) 139/91, pulse 77, temperature 98.1 °F (36.7 °C), resp. rate 18, height 5' 3\" (1.6 m), weight 186 lb (84.4 kg), last menstrual period 12/09/2020, SpO2 99 %.     Fetal heart rate:       Baseline Heart Rate:  140        Accelerations:  present       Long Term Variability:  moderate       Decelerations:  absent         Contraction frequency: 3 minutes    Membranes:  Ruptured clear fluid    Cervix:         Dilation:  9 cm with anterior lip edema         Effacement:  100%         Station:  -1         Position:  Anterior, OP position             ASSESSMENT     Prolonged first stage  OP position    PLAN:    Decrease epidural rate to 8  Will try knee chest position

## 2021-09-06 NOTE — PROGRESS NOTES
Recovery period is over at this time. Assisted pt up to restroom, pt able to void ok after delivery. Pt showered, bed linens changed.

## 2021-09-06 NOTE — PROGRESS NOTES
Assumed care of pt at this time, report given from previous RN, POC for the night discussed with pt pt verbalizes understanding. Pt currently here in labor. Pitocin is a 20. Fetal heart tones and uterine contractions monitored and assessed every 15 minutes while IV pitocin is infusing. Pt denies any pain with epidural or vaginal bleeding. Pt perceives positive fetal movement. Audible heart tones heard by RN. SVE performed, and then pt turned to right hydrant position. Comfort needs met, call light within reach.

## 2021-09-06 NOTE — PROGRESS NOTES
of live, viable,  male at 46. [place] brought to mother's abd. Mouth and nares suctioned with bulb syringe, and  tactile stimulated. [place] brought to the radiant warmer for further evaluation by nursery nurse.

## 2021-09-07 LAB
HCT VFR BLD CALC: 32.6 % (ref 34–48)
HEMOGLOBIN: 10.4 G/DL (ref 11.5–15.5)

## 2021-09-07 PROCEDURE — 1220000001 HC SEMI PRIVATE L&D R&B

## 2021-09-07 PROCEDURE — 36415 COLL VENOUS BLD VENIPUNCTURE: CPT

## 2021-09-07 PROCEDURE — 85014 HEMATOCRIT: CPT

## 2021-09-07 PROCEDURE — 85018 HEMOGLOBIN: CPT

## 2021-09-07 PROCEDURE — 6370000000 HC RX 637 (ALT 250 FOR IP): Performed by: OBSTETRICS & GYNECOLOGY

## 2021-09-07 RX ADMIN — IBUPROFEN 800 MG: 800 TABLET, FILM COATED ORAL at 22:02

## 2021-09-07 RX ADMIN — IBUPROFEN 800 MG: 800 TABLET, FILM COATED ORAL at 07:47

## 2021-09-07 RX ADMIN — DOCUSATE SODIUM 100 MG: 100 CAPSULE, LIQUID FILLED ORAL at 07:47

## 2021-09-07 RX ADMIN — DOCUSATE SODIUM 100 MG: 100 CAPSULE, LIQUID FILLED ORAL at 20:20

## 2021-09-07 RX ADMIN — IBUPROFEN 800 MG: 800 TABLET, FILM COATED ORAL at 15:59

## 2021-09-07 ASSESSMENT — PAIN SCALES - GENERAL
PAINLEVEL_OUTOF10: 3
PAINLEVEL_OUTOF10: 5

## 2021-09-07 NOTE — PROGRESS NOTES
[employer] [place] [facility] screening results were discussed with parent. Questions answered. Brochure given to parent. Advised to monitor developmental milestones and contact physician for any concerns.    Tye Parra, Víctor

## 2021-09-07 NOTE — ANESTHESIA POSTPROCEDURE EVALUATION
Department of Anesthesiology  Postprocedure Note    Patient: Janak Jay  MRN: [id number]  YOB: 2000  Date of evaluation: 9/7/2021  Time:  9:48 AM     Procedure Summary     Date: 09/05/21 Room / Location:     Anesthesia Start: 1447 Anesthesia Stop: 09/06/21 0357    Procedure: Labor Analgesia Diagnosis:     Scheduled Providers:  Responsible Provider: Lakhwinder Mejia MD    Anesthesia Type: general ASA Status: 2          Anesthesia Type: general    Maria T Phase I:      Maria T Phase II:      Last vitals: Reviewed and per EMR flowsheets.        Anesthesia Post Evaluation    Patient location during evaluation: bedside  Patient participation: complete - patient participated  Level of consciousness: awake and alert  Airway patency: patent  Nausea & Vomiting: no nausea and no vomiting  Complications: no  Cardiovascular status: hemodynamically stable  Respiratory status: acceptable  Hydration status: euvolemic  Comments: Patient satisfied with epidural for labor

## 2021-09-07 NOTE — PROGRESS NOTES
Gave patient a [employer] [place] hand-held breast pump, along with education on usage. Patient verbalized understanding with no questions.

## 2021-09-07 NOTE — PLAN OF CARE
Problem: Pain - Acute:  Goal: Pain level will decrease  Description: Pain level will decrease  Outcome: Met This Shift   Medicate as needed, comfort measures offered  Problem: Anxiety:  Goal: Level of anxiety will decrease  Description: Level of anxiety will decrease  Outcome: Completed     Problem: Breathing Pattern - Ineffective:  Goal: Able to breathe comfortably  Description: Able to breathe comfortably  Outcome: Completed     Problem: Fluid Volume - Imbalance:  Goal: Absence of imbalanced fluid volume signs and symptoms  Description: Absence of imbalanced fluid volume signs and symptoms  Outcome: Completed  Goal: Absence of intrapartum hemorrhage signs and symptoms  Description: Absence of intrapartum hemorrhage signs and symptoms  Outcome: Completed     Problem: Infection - Intrapartum Infection:  Goal: Will show no infection signs and symptoms  Description: Will show no infection signs and symptoms  Outcome: Completed     Problem: Labor Process - Prolonged:  Goal: Labor progression, first stage, within specified pattern  Description: Labor progression, first stage, within specified pattern  Outcome: Completed  Goal: Labor progession, second stage, within specified pattern  Description: Labor progession, second stage, within specified pattern  Outcome: Completed  Goal: Uterine contractions within specified parameters  Description: Uterine contractions within specified parameters  Outcome: Completed     Problem:  Screening:  Goal: Ability to make informed decisions regarding treatment has improved  Description: Ability to make informed decisions regarding treatment has improved  Outcome: Completed     Problem: Pain - Acute:  Goal: Pain level will decrease  Description: Pain level will decrease  Outcome: Completed  Goal: Able to cope with pain  Description: Able to cope with pain  Outcome: Completed     Problem: Tissue Perfusion - Uteroplacental, Altered:  Goal: Absence of abnormal fetal heart rate pattern  Description: Absence of abnormal fetal heart rate pattern  Outcome: Completed     Problem: Urinary Retention:  Goal: Experiences of bladder distention will decrease  Description: Experiences of bladder distention will decrease  Outcome: Completed  Goal: Urinary elimination within specified parameters  Description: Urinary elimination within specified parameters  Outcome: Completed     Problem: Pain:  Goal: Pain level will decrease  Description: Pain level will decrease  Outcome: Completed  Goal: Control of acute pain  Description: Control of acute pain  Outcome: Completed  Goal: Control of chronic pain  Description: Control of chronic pain  Outcome: Completed     Problem: Breastfeeding - Ineffective:  Goal: Infant able to latch onto breast  Description: Infant able to latch onto breast  Outcome: Completed  Goal: Intact skin on mother's nipple  Description: Intact skin on mother's nipple  Outcome: Completed  Goal: Uninterrupted skin-to-skin contact during initial breast-feeding if medically possible  Description: Uninterrupted skin-to-skin contact during initial breast-feeding if medically possible  Outcome: Completed  Goal: Urine and stool output as expected for age  Description: Urine and stool output as expected for age  Outcome: Completed  Goal: Weight loss within specified parameters for age  Description: Weight loss within specified parameters for age  Outcome: Completed

## 2021-09-08 VITALS
OXYGEN SATURATION: 98 % | TEMPERATURE: 98.2 F | WEIGHT: 186 LBS | HEART RATE: 82 BPM | BODY MASS INDEX: 32.96 KG/M2 | SYSTOLIC BLOOD PRESSURE: 124 MMHG | DIASTOLIC BLOOD PRESSURE: 74 MMHG | HEIGHT: 63 IN | RESPIRATION RATE: 17 BRPM

## 2021-09-08 PROCEDURE — 6360000002 HC RX W HCPCS: Performed by: OBSTETRICS & GYNECOLOGY

## 2021-09-08 PROCEDURE — 90471 IMMUNIZATION ADMIN: CPT | Performed by: OBSTETRICS & GYNECOLOGY

## 2021-09-08 PROCEDURE — 90715 TDAP VACCINE 7 YRS/> IM: CPT | Performed by: OBSTETRICS & GYNECOLOGY

## 2021-09-08 PROCEDURE — 6370000000 HC RX 637 (ALT 250 FOR IP): Performed by: OBSTETRICS & GYNECOLOGY

## 2021-09-08 RX ORDER — IBUPROFEN 800 MG/1
800 TABLET ORAL EVERY 6 HOURS PRN
Qty: 120 TABLET | Refills: 0 | Status: SHIPPED | OUTPATIENT
Start: 2021-09-08

## 2021-09-08 RX ADMIN — IBUPROFEN 800 MG: 800 TABLET, FILM COATED ORAL at 09:19

## 2021-09-08 RX ADMIN — DOCUSATE SODIUM 100 MG: 100 CAPSULE, LIQUID FILLED ORAL at 09:19

## 2021-09-08 RX ADMIN — OXYCODONE AND ACETAMINOPHEN 2 TABLET: 5; 325 TABLET ORAL at 06:33

## 2021-09-08 RX ADMIN — TETANUS TOXOID, REDUCED DIPHTHERIA TOXOID AND ACELLULAR PERTUSSIS VACCINE, ADSORBED 0.5 ML: 5; 2.5; 8; 8; 2.5 SUSPENSION INTRAMUSCULAR at 10:52

## 2021-09-08 RX ADMIN — Medication: at 09:19

## 2021-09-08 ASSESSMENT — PAIN DESCRIPTION - PAIN TYPE: TYPE: ACUTE PAIN

## 2021-09-08 ASSESSMENT — PAIN DESCRIPTION - ORIENTATION: ORIENTATION: LOWER

## 2021-09-08 ASSESSMENT — PAIN SCALES - GENERAL
PAINLEVEL_OUTOF10: 7
PAINLEVEL_OUTOF10: 4

## 2021-09-08 ASSESSMENT — PAIN DESCRIPTION - DESCRIPTORS: DESCRIPTORS: DISCOMFORT;BURNING

## 2021-09-08 ASSESSMENT — PAIN DESCRIPTION - LOCATION: LOCATION: VAGINA

## 2021-09-08 NOTE — PROGRESS NOTES
CLINICAL PHARMACY NOTE: MEDS TO BEDS    Total # of Prescriptions Filled: 1   The following medications were delivered to the patient:  · Ibuprofen 800 mg    Additional Documentation:

## 2021-09-08 NOTE — DISCHARGE SUMMARY
Obstetrical Discharge Form         Patients Name  Jade Tyrone    Gestational Age:  44w7d    Antepartum complications: none    Date of Delivery:   2021       3:57 AM      Type of Delivery:   Vaginal, Spontaneous [250]   Rupture Date/time:    No data found      No data found     Presentation:    Vertex [1]   Position:                      Anesthesia:    Epidural [254]     Feeding method:         Delivered By:   OSBALDO Fowler     Baby:       Information for the patient's :  Jin Taniyaon [[id number]]          Intrapartum complications: None  Postpartum complications: none  Discharge Date:   2021  Discharge Condition: Stable  Disposition:   Home    Plan:   Follow up    in 6 weeks

## 2021-09-08 NOTE — PLAN OF CARE
Lactation Consultant phone number given to patient for any post-discharge questions or concerns and Labor & Delivery phone number given if Lactation Consultant not available.   NGUYEN JoseN, RN, IBCLC, CCCE

## 2022-07-27 ENCOUNTER — HOSPITAL ENCOUNTER (EMERGENCY)
Age: 22
Discharge: HOME | End: 2022-07-27
Payer: COMMERCIAL

## 2022-07-27 VITALS
TEMPERATURE: 97.7 F | BODY MASS INDEX: 31.89 KG/M2 | DIASTOLIC BLOOD PRESSURE: 91 MMHG | HEIGHT: 63 IN | RESPIRATION RATE: 18 BRPM | WEIGHT: 180 LBS | OXYGEN SATURATION: 98 % | SYSTOLIC BLOOD PRESSURE: 155 MMHG | HEART RATE: 103 BPM

## 2022-07-27 DIAGNOSIS — N39.0 URINARY TRACT INFECTION WITHOUT HEMATURIA, SITE UNSPECIFIED: ICD-10-CM

## 2022-07-27 DIAGNOSIS — J02.9 SORE THROAT: Primary | ICD-10-CM

## 2022-07-27 DIAGNOSIS — R19.7 DIARRHEA, UNSPECIFIED TYPE: ICD-10-CM

## 2022-07-27 LAB
BACTERIA: ABNORMAL /HPF
BASOPHILS ABSOLUTE: 0.02 E9/L (ref 0–0.2)
BASOPHILS RELATIVE PERCENT: 0.2 % (ref 0–2)
BILIRUBIN URINE: NEGATIVE
BLOOD, URINE: NEGATIVE
CLARITY: NORMAL
CO2: 26 MMOL/L (ref 22–29)
COLOR: YELLOW
EOSINOPHILS ABSOLUTE: 0.06 E9/L (ref 0.05–0.5)
EOSINOPHILS RELATIVE PERCENT: 0.7 % (ref 0–6)
EPITHELIAL CELLS, UA: ABNORMAL /HPF
GFR AFRICAN AMERICAN: >60
GFR NON-AFRICAN AMERICAN: >60 ML/MIN/1.73
GLUCOSE BLD-MCNC: 92 MG/DL (ref 74–99)
GLUCOSE URINE: NEGATIVE MG/DL
HCG(URINE) PREGNANCY TEST: NEGATIVE
HCT VFR BLD CALC: 41 % (ref 34–48)
HEMOGLOBIN: 13.1 G/DL (ref 11.5–15.5)
IMMATURE GRANULOCYTES #: 0.02 E9/L
IMMATURE GRANULOCYTES %: 0.2 % (ref 0–5)
KETONES, URINE: NEGATIVE MG/DL
LEUKOCYTE ESTERASE, URINE: NEGATIVE
LYMPHOCYTES ABSOLUTE: 1.59 E9/L (ref 1.5–4)
LYMPHOCYTES RELATIVE PERCENT: 17.5 % (ref 20–42)
MCH RBC QN AUTO: 27.2 PG (ref 26–35)
MCHC RBC AUTO-ENTMCNC: 32 % (ref 32–34.5)
MCV RBC AUTO: 85.1 FL (ref 80–99.9)
MONOCYTES ABSOLUTE: 0.83 E9/L (ref 0.1–0.95)
MONOCYTES RELATIVE PERCENT: 9.1 % (ref 2–12)
NEUTROPHILS ABSOLUTE: 6.58 E9/L (ref 1.8–7.3)
NEUTROPHILS RELATIVE PERCENT: 72.3 % (ref 43–80)
NITRITE, URINE: NEGATIVE
PDW BLD-RTO: 14.2 FL (ref 11.5–15)
PERFORMED ON: NORMAL
PH UA: 5.5 (ref 5–9)
PLATELET # BLD: 240 E9/L (ref 130–450)
PMV BLD AUTO: 9.8 FL (ref 7–12)
POC ANION GAP: 10 MMOL/L (ref 7–16)
POC BUN: 8 MG/DL (ref 8–23)
POC CHLORIDE: 108 MMOL/L (ref 100–108)
POC CREATININE: 0.9 MG/DL (ref 0.5–1)
POC POTASSIUM: 4.3 MMOL/L (ref 3.5–5)
POC SODIUM: 144 MMOL/L (ref 132–146)
PROTEIN UA: NEGATIVE MG/DL
RBC # BLD: 4.82 E12/L (ref 3.5–5.5)
RBC UA: ABNORMAL /HPF (ref 0–2)
SPECIFIC GRAVITY UA: >=1.03 (ref 1–1.03)
STREP GRP A PCR: NEGATIVE
UROBILINOGEN, URINE: 0.2 E.U./DL
WBC # BLD: 9.1 E9/L (ref 4.5–11.5)
WBC UA: ABNORMAL /HPF (ref 0–5)

## 2022-07-27 PROCEDURE — 80051 ELECTROLYTE PANEL: CPT

## 2022-07-27 PROCEDURE — 85025 COMPLETE CBC W/AUTO DIFF WBC: CPT

## 2022-07-27 PROCEDURE — 82565 ASSAY OF CREATININE: CPT

## 2022-07-27 PROCEDURE — 99211 OFF/OP EST MAY X REQ PHY/QHP: CPT

## 2022-07-27 PROCEDURE — 82947 ASSAY GLUCOSE BLOOD QUANT: CPT

## 2022-07-27 PROCEDURE — 81025 URINE PREGNANCY TEST: CPT

## 2022-07-27 PROCEDURE — 36415 COLL VENOUS BLD VENIPUNCTURE: CPT

## 2022-07-27 PROCEDURE — 81001 URINALYSIS AUTO W/SCOPE: CPT

## 2022-07-27 PROCEDURE — 84520 ASSAY OF UREA NITROGEN: CPT

## 2022-07-27 PROCEDURE — 87880 STREP A ASSAY W/OPTIC: CPT

## 2022-07-27 RX ORDER — SULFAMETHOXAZOLE AND TRIMETHOPRIM 800; 160 MG/1; MG/1
1 TABLET ORAL 2 TIMES DAILY
Qty: 6 TABLET | Refills: 0 | Status: SHIPPED | OUTPATIENT
Start: 2022-07-27 | End: 2022-07-30

## 2022-07-27 ASSESSMENT — PAIN - FUNCTIONAL ASSESSMENT: PAIN_FUNCTIONAL_ASSESSMENT: 0-10

## 2022-07-27 ASSESSMENT — PAIN DESCRIPTION - DESCRIPTORS: DESCRIPTORS: ACHING;BURNING

## 2022-07-27 ASSESSMENT — PAIN SCALES - GENERAL: PAINLEVEL_OUTOF10: 7

## 2022-07-27 NOTE — ED PROVIDER NOTES
[place]   Department of Emergency [facility]  Provider Note  Admit Date/RoomTime: 2022  1:12 PM  Room:   NAME: Courtney Bowman  : 2000  MRN: [id number]     Chief Complaint:  Pharyngitis (Neck hurts   hurts to eat  stomach hurts  started 4 days ago also having diarrhea)    History of Present Illness      Courtney Bowman is a 25 y.o. old female who has a past medical history of:   Past Medical History:   Diagnosis Date     (spontaneous vaginal delivery) 2021    presents to the urgent care center by private vehicle, for sore throat, some diarrhea, and cramping in the lower abdomen. This all started 4 days ago. She does not have a fever she does not have any respiratory symptoms she does not have cough nasal congestion chest pain or shortness of breath. No vaginal discharge. Last menstrual period was 2022. ROS    Pertinent positives and negatives are stated within HPI, all other systems reviewed and are negative. Past Surgical History:  has a past surgical history that includes Tonsillectomy and Tonsillectomy (age 9). Social History:  reports that she is a non-smoker but has been exposed to tobacco smoke. She has never used smokeless tobacco. She reports that she does not currently use alcohol. She reports that she does not use drugs. Family History: family history includes Colon Cancer in her maternal grandmother. Allergies: Patient has no known allergies. Physical Exam           ED Triage Vitals   BP Temp Temp src Heart Rate Resp SpO2 Height Weight   22 1318 22 1318 -- 22 1318 22 1319 22 1319 22 1319 22 1319   (!) 155/91 97.7 °F (36.5 °C)  (!) 103 18 98 % 5' 3\" (1.6 m) 180 lb (81.6 kg)     Oxygen Saturation Interpretation: Normal.    Constitutional:  Alert, no distress  Ears:  TMs without perforation, injection, or bulging. External canals clear without exudate.   Nose:   There is no discharge, swelling or lesions noted. Throat: Airway Patent. no erythema or exudates noted. Teeth and gums normal..       Neck/Lymphatic:  neck Supple. There is no  anterior cervical node tenderness. respiratory:  Clear to auscultation and breath sounds equal.    CV: Regular rate and rhythm, normal heart sounds, without pathological murmurs, ectopy, gallops, or rubs. GI:  Abdomen Soft, nontender, good bowel sounds. No firm or pulsatile mass. Inegument:  No rashes, erythema present. Neurological:  Oriented. Motor functions intact.     Lab / Imaging Results   (All laboratory and radiology results have been personally reviewed by myself)  Labs:  Results for orders placed or performed during the hospital encounter of 07/27/22   Strep Screen Group A Throat    Specimen: Throat   Result Value Ref Range    Strep Grp A PCR Negative Negative   Pregnancy, Urine   Result Value Ref Range    HCG(Urine) Pregnancy Test NEGATIVE NEGATIVE   Urinalysis   Result Value Ref Range    Color, UA Yellow Straw/Yellow    Clarity, UA SL CLOUDY Clear    Glucose, Ur Negative Negative mg/dL    Bilirubin Urine Negative Negative    Ketones, Urine Negative Negative mg/dL    Specific Gravity, UA >=1.030 1.005 - 1.030    Blood, Urine Negative Negative    pH, UA 5.5 5.0 - 9.0    Protein, UA Negative Negative mg/dL    Urobilinogen, Urine 0.2 <2.0 E.U./dL    Nitrite, Urine Negative Negative    Leukocyte Esterase, Urine Negative Negative   CBC with Auto Differential   Result Value Ref Range    WBC 9.1 4.5 - 11.5 E9/L    RBC 4.82 3.50 - 5.50 E12/L    Hemoglobin 13.1 11.5 - 15.5 g/dL    Hematocrit 41.0 34.0 - 48.0 %    MCV 85.1 80.0 - 99.9 fL    MCH 27.2 26.0 - 35.0 pg    MCHC 32.0 32.0 - 34.5 %    RDW 14.2 11.5 - 15.0 fL    Platelets 595 023 - 182 E9/L    MPV 9.8 7.0 - 12.0 fL    Neutrophils % 72.3 43.0 - 80.0 %    Immature Granulocytes % 0.2 0.0 - 5.0 %    Lymphocytes % 17.5 (L) 20.0 - 42.0 %    Monocytes % 9.1 2.0 - 12.0 %    Eosinophils % 0.7 0.0 - 6.0 % Basophils % 0.2 0.0 - 2.0 %    Neutrophils Absolute 6.58 1.80 - 7.30 E9/L    Immature Granulocytes # 0.02 E9/L    Lymphocytes Absolute 1.59 1.50 - 4.00 E9/L    Monocytes Absolute 0.83 0.10 - 0.95 E9/L    Eosinophils Absolute 0.06 0.05 - 0.50 E9/L    Basophils Absolute 0.02 0.00 - 0.20 E9/L   Microscopic Urinalysis   Result Value Ref Range    WBC, UA 0-1 0 - 5 /HPF    RBC, UA NONE 0 - 2 /HPF    Epithelial Cells, UA FEW /HPF    Bacteria, UA MODERATE (A) None Seen /HPF   POCT Venous   Result Value Ref Range    POC Sodium 144 132 - 146 mmol/L    POC Potassium 4.3 3.5 - 5.0 mmol/L    POC Chloride 108 100 - 108 mmol/L    CO2 26 22 - 29 mmol/L    POC Anion Gap 10 7 - 16 mmol/L    POC Glucose 92 74 - 99 mg/dl    POC BUN 8 8 - 23 mg/dL    POC Creatinine 0.9 0.5 - 1.0 mg/dL    GFR Non-African American >60 >=60 mL/min/1.73    GFR  >60     Performed on SEE BELOW      Imaging: All Radiology results interpreted by Radiologist unless otherwise noted. No orders to display       ED Course / Medical Decision Making   Medications - No data to display       MDM: SHe appears well she has good bowel sounds her abdomen is soft she was not tender to palpation. She said that she is having severe cramping especially when she is having episodes of diarrhea anytime she eats. She is not tender in the right lower quadrant so I do not really suspect appendicitis. SHe is not complaining of any vaginal discharge or drainage. I did check blood work on her her blood work was all within normal limits her urine did show moderate bacteria but no other findings. Pregnancy test was negative. She also is complaining of a sore throat and it was negative for strep. I did offer to do a COVID test --  Because of the diarrhea and a sore throat. -- she declined. She does not have a fever she does not have any respiratory symptoms .  To cover the bacteria in the urine I did give her a few days of Bactrim advised her to drink more fluids. vies her to take over-the-counter medication for diarrhea such as Imodium and for the sore throat Tylenol or ibuprofen if things do not improve or worsens she was advised to get reevaluated          Plan of Care: Normal progression of disease discussed. All questions answered. Explained the rationale for symptomatic treatment rather than use of an antibiotic. Instruction provided in the use of fluids, vaporizer, acetaminophen, and other OTC medication for symptom control. Extra fluids  Follow up as needed should symptoms fail to improve. Plan of Care/Counseling:  I reviewed today's visit with the patient in addition to providing specific details for the plan of care and counseling regarding the diagnosis and prognosis. Questions are answered at this time and are agreeable with the plan. Assessment     1. Sore throat    2. Urinary tract infection without hematuria, site unspecified    3. Diarrhea, unspecified type      Plan   Discharge to home and advised to contact [employer], [address]    Schedule an appointment as soon as possible for a visit    Patient condition is good    New Medications     New Prescriptions    SULFAMETHOXAZOLE-TRIMETHOPRIM (BACTRIM DS) 800-160 MG PER TABLET    Take 1 tablet by mouth in the morning and 1 tablet before bedtime. Do all this for 3 days. Electronically signed by SAI Montana CNP   DD: 7/27/22  **This report was transcribed using voice recognition software. Every effort was made to ensure accuracy; however, inadvertent computerized transcription errors may be present.   END OF ED PROVIDER NOTE        SAI Montana CNP  07/27/22 [id number]